# Patient Record
Sex: MALE | Race: WHITE | NOT HISPANIC OR LATINO | Employment: FULL TIME | URBAN - METROPOLITAN AREA
[De-identification: names, ages, dates, MRNs, and addresses within clinical notes are randomized per-mention and may not be internally consistent; named-entity substitution may affect disease eponyms.]

---

## 2017-05-15 ENCOUNTER — OFFICE VISIT (OUTPATIENT)
Dept: FAMILY MEDICINE | Facility: CLINIC | Age: 42
End: 2017-05-15
Payer: COMMERCIAL

## 2017-05-15 ENCOUNTER — TELEPHONE (OUTPATIENT)
Dept: FAMILY MEDICINE | Facility: CLINIC | Age: 42
End: 2017-05-15

## 2017-05-15 ENCOUNTER — APPOINTMENT (OUTPATIENT)
Dept: RADIOLOGY | Facility: HOSPITAL | Age: 42
End: 2017-05-15
Attending: FAMILY MEDICINE
Payer: COMMERCIAL

## 2017-05-15 VITALS
RESPIRATION RATE: 20 BRPM | DIASTOLIC BLOOD PRESSURE: 80 MMHG | BODY MASS INDEX: 23.62 KG/M2 | HEIGHT: 70 IN | OXYGEN SATURATION: 98 % | WEIGHT: 165 LBS | OXYGEN SATURATION: 98 % | BODY MASS INDEX: 23.6 KG/M2 | WEIGHT: 164.88 LBS | DIASTOLIC BLOOD PRESSURE: 80 MMHG | HEART RATE: 105 BPM | HEIGHT: 70 IN | SYSTOLIC BLOOD PRESSURE: 150 MMHG | TEMPERATURE: 99 F | TEMPERATURE: 98 F | HEART RATE: 99 BPM | SYSTOLIC BLOOD PRESSURE: 140 MMHG

## 2017-05-15 DIAGNOSIS — M25.511 ACUTE PAIN OF RIGHT SHOULDER: ICD-10-CM

## 2017-05-15 DIAGNOSIS — S49.91XA SHOULDER INJURY, RIGHT, INITIAL ENCOUNTER: ICD-10-CM

## 2017-05-15 DIAGNOSIS — S49.91XA SHOULDER INJURY, RIGHT, INITIAL ENCOUNTER: Primary | ICD-10-CM

## 2017-05-15 PROCEDURE — 73030 X-RAY EXAM OF SHOULDER: CPT | Mod: 26,RT,, | Performed by: RADIOLOGY

## 2017-05-15 PROCEDURE — 73030 X-RAY EXAM OF SHOULDER: CPT | Mod: TC,PN,RT

## 2017-05-15 PROCEDURE — 1160F RVW MEDS BY RX/DR IN RCRD: CPT | Mod: S$GLB,,, | Performed by: FAMILY MEDICINE

## 2017-05-15 PROCEDURE — 99499 UNLISTED E&M SERVICE: CPT | Mod: S$GLB,,, | Performed by: NURSE PRACTITIONER

## 2017-05-15 PROCEDURE — 99999 PR PBB SHADOW E&M-EST. PATIENT-LVL III: CPT | Mod: PBBFAC,,,

## 2017-05-15 PROCEDURE — 99213 OFFICE O/P EST LOW 20 MIN: CPT | Mod: S$GLB,,, | Performed by: FAMILY MEDICINE

## 2017-05-15 PROCEDURE — 99999 PR PBB SHADOW E&M-EST. PATIENT-LVL IV: CPT | Mod: PBBFAC,,, | Performed by: FAMILY MEDICINE

## 2017-05-15 RX ORDER — HYDROCODONE BITARTRATE AND ACETAMINOPHEN 5; 325 MG/1; MG/1
1 TABLET ORAL EVERY 6 HOURS PRN
Qty: 20 TABLET | Refills: 0 | Status: SHIPPED | OUTPATIENT
Start: 2017-05-15 | End: 2017-05-23 | Stop reason: SDUPTHER

## 2017-05-15 NOTE — PROGRESS NOTES
"Routine Office Visit    Patient Name: Ronn Hurtado    : 1975  MRN: [id number]    Subjective:  Ronn is a 42 y.o. male who presents today for:    1.  Shoulder injury - on Saturday night, had a few beers and had a crawfish boil for his son graduating high school, and was cleaning around the house.  Put the boiler up 2 ft above in a loft area, and when he wasn't looking it slid back down and a prong about 1/2inch in diameter fell straight onto his R shoulder.  It was very red that evening.  The next day and today it has been increasing in swelling and pain.  He went to work and was unable to lift his R arm up.   He has been using tylenol and ibuprofen with no relief.  He's tried tramadol before and "it didn't touch the pain I was having."  He denies any shoulder injuries in the past.  He denies f/c/n/v/d.     Past Medical History  Past Medical History:   Diagnosis Date    Heart murmur     [age] y/o no Echo        Past Surgical History  Past Surgical History:   Procedure Laterality Date    NO PAST SURGERIES         Family History  Family History   Problem Relation Age of Onset    Cancer Mother 58     COLON     Diabetes Father     Pacemaker/defibrilator Father     Cancer Paternal Grandmother      Breast       Social History  Social History     Social History    Marital status:      Spouse name: N/A    Number of children: N/A    Years of education: N/A     Occupational History    Not on file.     Social History Main Topics    Smoking status: Current Every Day Smoker    Smokeless tobacco: Current User      Comment: vaping 6mg    Alcohol use 0.0 oz/week     0 Standard drinks or equivalent per week      Comment: Socially    Drug use: No    Sexual activity: Yes     Partners: Female     Other Topics Concern    Not on file     Social History Narrative       Current Medications  No current outpatient prescriptions on file prior to visit.     No current facility-administered medications on file " "prior to visit.        Allergies   Review of patient's allergies indicates:  No Known Allergies    Review of Systems (Pertinent positives)  Constititutional: Weight loss, excess fatigue, chills, fever, night sweats, weakness, loss of appetite  Lungs: Cough, sputum,wheeze, frequent URI, SOA, Asthma  Heart: Chest pain, angina, palpitations, extra heart beats  Stomach/Intestine: Heartburn, Nausea, vomiting, diarrhea, indigestion, bloating  Bones/Muscles/Joints: Joint pain, deformities, back pain, swelling, stiffness    BP (!) 140/80 (BP Location: Left arm, Patient Position: Sitting, BP Method: Manual)  Pulse 105  Temp 98.1 °F (36.7 °C) (Oral)   Resp 20  Ht 5' 10" (1.778 m)  Wt 74.8 kg (164 lb 14.5 oz)  SpO2 98%  BMI 23.66 kg/m2    GENERAL APPEARANCE: in no apparent distress and well developed and well nourished  RESPIRATORY: appears well, vitals normal, no respiratory distress, acyanotic, normal RR, chest clear, no wheezing, crepitations, rhonchi, normal symmetric air entry  HEART: regular rate and rhythm, S1, S2 normal, no murmur, click, rub or gallop.    NEUROLOGIC: normal without focal findings, CN II-XII are intact.    Extremities: warm/well perfused.  No abnormal hair patterns.  No clubbing, cyanosis or edema.  + baseball sized, soft, fluid filled mass in anterior shoulder which is painful to the touch.  Patient has shoulder internally rotated.  Able to externally rotate without issues.  No erythema or induration.  No tenderness to palp over bony prominences.   Unable to abduct the arm without significant pain.    SKIN: no rashes, no wounds, no other lesions  PSYCH: Alert, oriented x 3, thought content appropriate, speech normal, pleasant and cooperative, good eye contact, well groomed, recall good, concentration/judgement good and apparently average intelligence.    Assessment/Plan:  Ronn Hurtado is a 42 y.o. male who presents today for :    Ronn was seen today for shoulder injury.    Diagnoses and " all orders for this visit:    Shoulder injury, right, initial encounter  -     X-ray Shoulder 2 or More Views Right; Future  -     Ambulatory consult to Orthopedics  -     hydrocodone-acetaminophen 5-325mg (NORCO) 5-325 mg per tablet; Take 1 tablet by mouth every 6 (six) hours as needed for Pain.  -     Likely a contusion and fluid collection due to inflammation, and less likely bone injury.  Will r/o fracture with Xray.    F/u with bone and joint clinic

## 2017-05-15 NOTE — TELEPHONE ENCOUNTER
Left VM.  Wanted to discuss R shoulder Xray results.  No acute fracture or dislocation.  There are signs of prior dislocation though with slight deformity to the head of the humerus.  Suggest pain control, sling, cold compresses and f/u with orthopedics.    Sincerely  Dr Stokes

## 2017-05-15 NOTE — MR AVS SNAPSHOT
[facility]  [address]  [place] [address]  Phone: [phone]                  Ronn Hurtado   5/15/2017 4:20 PM   Office Visit    Description:  Male : 1975   Provider:  Maureen Stokes MD   Department:  [facility]           Reason for Visit     Shoulder Injury           Diagnoses this Visit        Comments    Shoulder injury, right, initial encounter    -  Primary            To Do List           Goals (5 Years of Data)     None       These Medications        Disp Refills Start End    hydrocodone-acetaminophen 5-325mg (NORCO) 5-325 mg per tablet 20 tablet 0 5/15/2017     Take 1 tablet by mouth every 6 (six) hours as needed for Pain. - Oral    Pharmacy: [facility] [address] [place] [address] ALLA FREITAS [address] AT [facility] #: [phone]         [facility]     [facility] On Call Nurse Care Line -  Assistance  Unless otherwise directed by your provider, please contact [facility], our nurse care line that is available for  assistance.     Registered nurses in the [facility] Center provide: appointment scheduling, clinical advisement, health education, and other advisory services.  Call: [phone] (toll free)               Medications           Message regarding Medications     Verify the changes and/or additions to your medication regime listed below are the same as discussed with your clinician today.  If any of these changes or additions are incorrect, please notify your healthcare provider.        START taking these NEW medications        Refills    hydrocodone-acetaminophen 5-325mg (NORCO) 5-325 mg per tablet 0    Sig: Take 1 tablet by mouth every 6 (six) hours as needed for Pain.    Class: Print    Route: Oral           Verify that the below list of medications is an accurate representation of the medications you are currently taking.  If none reported, the list may be blank. If incorrect, please  "contact your healthcare provider. Carry this list with you in case of emergency.           Current Medications     hydrocodone-acetaminophen 5-325mg (NORCO) 5-325 mg per tablet Take 1 tablet by mouth every 6 (six) hours as needed for Pain.           Clinical Reference Information           Your Vitals Were     BP Pulse Temp Resp    140/80 (BP Location: Left arm, Patient Position: Sitting, BP Method: Manual) 105 98.1 °F (36.7 °C) (Oral) 20    Height Weight SpO2 BMI    5' 10" (1.778 m) 74.8 kg (164 lb 14.5 oz) 98% 23.66 kg/m2      Blood Pressure          Most Recent Value    BP  (!)  140/80      Allergies as of 5/15/2017     No Known Allergies      Immunizations Administered on Date of Encounter - 5/15/2017     None      Orders Placed During Today's Visit      Normal Orders This Visit    Ambulatory consult to Orthopedics     Future Labs/Procedures Expected by Expires    X-ray Shoulder 2 or More Views Right  5/15/2017 5/15/2018      [facility] Sign-Up     Activating your [facility] account is as easy as 1-2-3!     1) Visit [email or web address], select Sign Up Now, enter this activation code and your date of birth, then select Next.  [email or web address]  Expires: 6/29/2017  4:57 PM      2) Create a username and password to use when you visit [facility] in the future and select a security question in case you lose your password and select Next.    3) Enter your e-mail address and click Sign Up!    Additional Information  If you have questions, please e-mail [email or web address] or call [phone] to talk to our [facility] staff. Remember, [facility] is NOT to be used for urgent needs. For medical emergencies, dial 911.         Smoking Cessation     If you would like to quit smoking:   You may be eligible for free services if you are a [place] resident and started smoking cigarettes before September 1, 1988.  Call the Smoking Cessation Trust (SCT) toll free at (815) 019-8432 or (217) 448-1778.   Call 8-076-QUIT-NOW if you do " not meet the above criteria.   Contact us via email: [email or web address]   View our website for more information: [email or web address]        Language Assistance Services     ATTENTION: Language assistance services are available, free of charge. Please call [phone].      ATENCIÓN: Si habla lamar, tiene a reardon disposición servicios gratuitos de asistencia lingüística. Llame al [phone].     CHÚ Ý: N?u b?n nói Ti?ng Vi?t, có các d?ch v? h? tr? ngôn ng? mi?n phí dành cho b?n. G?i s? [phone].         [facility] complies with applicable Federal civil rights laws and does not discriminate on the basis of race, color, national origin, age, disability, or sex.

## 2017-05-23 RX ORDER — HYDROCODONE BITARTRATE AND ACETAMINOPHEN 5; 325 MG/1; MG/1
1 TABLET ORAL EVERY 6 HOURS PRN
Qty: 20 TABLET | Refills: 0 | Status: SHIPPED | OUTPATIENT
Start: 2017-05-23

## 2017-06-08 ENCOUNTER — LAB VISIT (OUTPATIENT)
Dept: LAB | Facility: HOSPITAL | Age: 42
End: 2017-06-08
Attending: ORTHOPAEDIC SURGERY
Payer: COMMERCIAL

## 2017-06-08 DIAGNOSIS — R22.31 MASS OF FINGER OF RIGHT HAND: Primary | ICD-10-CM

## 2017-06-08 PROCEDURE — 88307 TISSUE EXAM BY PATHOLOGIST: CPT | Performed by: PATHOLOGY

## 2017-06-08 PROCEDURE — 88307 TISSUE EXAM BY PATHOLOGIST: CPT | Mod: 26,,, | Performed by: PATHOLOGY
